# Patient Record
Sex: FEMALE | Race: OTHER | NOT HISPANIC OR LATINO
[De-identification: names, ages, dates, MRNs, and addresses within clinical notes are randomized per-mention and may not be internally consistent; named-entity substitution may affect disease eponyms.]

---

## 2017-02-10 ENCOUNTER — APPOINTMENT (OUTPATIENT)
Dept: RHEUMATOLOGY | Facility: CLINIC | Age: 52
End: 2017-02-10

## 2021-05-01 ENCOUNTER — INPATIENT (INPATIENT)
Facility: HOSPITAL | Age: 56
LOS: 3 days | Discharge: HOME | DRG: 287 | End: 2021-05-05
Attending: INTERNAL MEDICINE | Admitting: INTERNAL MEDICINE
Payer: COMMERCIAL

## 2021-05-01 VITALS
OXYGEN SATURATION: 97 % | HEIGHT: 63 IN | WEIGHT: 138.01 LBS | SYSTOLIC BLOOD PRESSURE: 144 MMHG | TEMPERATURE: 98 F | HEART RATE: 91 BPM | DIASTOLIC BLOOD PRESSURE: 91 MMHG | RESPIRATION RATE: 18 BRPM

## 2021-05-01 LAB
ALBUMIN SERPL ELPH-MCNC: 4.2 G/DL — SIGNIFICANT CHANGE UP (ref 3.3–5)
ALP SERPL-CCNC: 104 U/L — SIGNIFICANT CHANGE UP (ref 40–120)
ALT FLD-CCNC: 13 U/L — SIGNIFICANT CHANGE UP (ref 10–45)
ANION GAP SERPL CALC-SCNC: 12 MMOL/L — SIGNIFICANT CHANGE UP (ref 5–17)
APPEARANCE UR: CLEAR — SIGNIFICANT CHANGE UP
APTT BLD: 32.2 SEC — SIGNIFICANT CHANGE UP (ref 27.5–35.5)
AST SERPL-CCNC: 16 U/L — SIGNIFICANT CHANGE UP (ref 10–40)
BASOPHILS # BLD AUTO: 0.02 K/UL — SIGNIFICANT CHANGE UP (ref 0–0.2)
BASOPHILS NFR BLD AUTO: 0.3 % — SIGNIFICANT CHANGE UP (ref 0–2)
BILIRUB SERPL-MCNC: 0.8 MG/DL — SIGNIFICANT CHANGE UP (ref 0.2–1.2)
BILIRUB UR-MCNC: NEGATIVE — SIGNIFICANT CHANGE UP
BUN SERPL-MCNC: 12 MG/DL — SIGNIFICANT CHANGE UP (ref 7–23)
CALCIUM SERPL-MCNC: 9.2 MG/DL — SIGNIFICANT CHANGE UP (ref 8.4–10.5)
CHLORIDE SERPL-SCNC: 104 MMOL/L — SIGNIFICANT CHANGE UP (ref 96–108)
CO2 SERPL-SCNC: 23 MMOL/L — SIGNIFICANT CHANGE UP (ref 22–31)
COLOR SPEC: COLORLESS — SIGNIFICANT CHANGE UP
CREAT SERPL-MCNC: 0.59 MG/DL — SIGNIFICANT CHANGE UP (ref 0.5–1.3)
DIFF PNL FLD: NEGATIVE — SIGNIFICANT CHANGE UP
EOSINOPHIL # BLD AUTO: 0.15 K/UL — SIGNIFICANT CHANGE UP (ref 0–0.5)
EOSINOPHIL NFR BLD AUTO: 2.1 % — SIGNIFICANT CHANGE UP (ref 0–6)
GAS PNL BLDV: SIGNIFICANT CHANGE UP
GLUCOSE SERPL-MCNC: 97 MG/DL — SIGNIFICANT CHANGE UP (ref 70–99)
GLUCOSE UR QL: NEGATIVE — SIGNIFICANT CHANGE UP
HCT VFR BLD CALC: 41.1 % — SIGNIFICANT CHANGE UP (ref 34.5–45)
HGB BLD-MCNC: 13.2 G/DL — SIGNIFICANT CHANGE UP (ref 11.5–15.5)
IMM GRANULOCYTES NFR BLD AUTO: 0.4 % — SIGNIFICANT CHANGE UP (ref 0–1.5)
INR BLD: 1.14 RATIO — SIGNIFICANT CHANGE UP (ref 0.88–1.16)
KETONES UR-MCNC: NEGATIVE — SIGNIFICANT CHANGE UP
LEUKOCYTE ESTERASE UR-ACNC: NEGATIVE — SIGNIFICANT CHANGE UP
LIDOCAIN IGE QN: 38 U/L — SIGNIFICANT CHANGE UP (ref 7–60)
LYMPHOCYTES # BLD AUTO: 2.18 K/UL — SIGNIFICANT CHANGE UP (ref 1–3.3)
LYMPHOCYTES # BLD AUTO: 30.7 % — SIGNIFICANT CHANGE UP (ref 13–44)
MCHC RBC-ENTMCNC: 28.9 PG — SIGNIFICANT CHANGE UP (ref 27–34)
MCHC RBC-ENTMCNC: 32.1 GM/DL — SIGNIFICANT CHANGE UP (ref 32–36)
MCV RBC AUTO: 90.1 FL — SIGNIFICANT CHANGE UP (ref 80–100)
MONOCYTES # BLD AUTO: 0.29 K/UL — SIGNIFICANT CHANGE UP (ref 0–0.9)
MONOCYTES NFR BLD AUTO: 4.1 % — SIGNIFICANT CHANGE UP (ref 2–14)
NEUTROPHILS # BLD AUTO: 4.44 K/UL — SIGNIFICANT CHANGE UP (ref 1.8–7.4)
NEUTROPHILS NFR BLD AUTO: 62.4 % — SIGNIFICANT CHANGE UP (ref 43–77)
NITRITE UR-MCNC: NEGATIVE — SIGNIFICANT CHANGE UP
NRBC # BLD: 0 /100 WBCS — SIGNIFICANT CHANGE UP (ref 0–0)
NT-PROBNP SERPL-SCNC: 5 PG/ML — SIGNIFICANT CHANGE UP (ref 0–300)
PH UR: 6.5 — SIGNIFICANT CHANGE UP (ref 5–8)
PLATELET # BLD AUTO: 236 K/UL — SIGNIFICANT CHANGE UP (ref 150–400)
POTASSIUM SERPL-MCNC: 3.9 MMOL/L — SIGNIFICANT CHANGE UP (ref 3.5–5.3)
POTASSIUM SERPL-SCNC: 3.9 MMOL/L — SIGNIFICANT CHANGE UP (ref 3.5–5.3)
PROT SERPL-MCNC: 7.5 G/DL — SIGNIFICANT CHANGE UP (ref 6–8.3)
PROT UR-MCNC: NEGATIVE — SIGNIFICANT CHANGE UP
PROTHROM AB SERPL-ACNC: 13.6 SEC — SIGNIFICANT CHANGE UP (ref 10.6–13.6)
RBC # BLD: 4.56 M/UL — SIGNIFICANT CHANGE UP (ref 3.8–5.2)
RBC # FLD: 11.9 % — SIGNIFICANT CHANGE UP (ref 10.3–14.5)
SARS-COV-2 RNA SPEC QL NAA+PROBE: SIGNIFICANT CHANGE UP
SODIUM SERPL-SCNC: 139 MMOL/L — SIGNIFICANT CHANGE UP (ref 135–145)
SP GR SPEC: 1.01 — SIGNIFICANT CHANGE UP (ref 1.01–1.02)
TROPONIN T, HIGH SENSITIVITY RESULT: <6 NG/L — SIGNIFICANT CHANGE UP (ref 0–51)
TROPONIN T, HIGH SENSITIVITY RESULT: <6 NG/L — SIGNIFICANT CHANGE UP (ref 0–51)
UROBILINOGEN FLD QL: NEGATIVE — SIGNIFICANT CHANGE UP
WBC # BLD: 7.11 K/UL — SIGNIFICANT CHANGE UP (ref 3.8–10.5)
WBC # FLD AUTO: 7.11 K/UL — SIGNIFICANT CHANGE UP (ref 3.8–10.5)

## 2021-05-01 PROCEDURE — 76700 US EXAM ABDOM COMPLETE: CPT | Mod: 26

## 2021-05-01 PROCEDURE — 71046 X-RAY EXAM CHEST 2 VIEWS: CPT | Mod: 26

## 2021-05-01 RX ORDER — ASPIRIN/CALCIUM CARB/MAGNESIUM 324 MG
81 TABLET ORAL DAILY
Refills: 0 | Status: DISCONTINUED | OUTPATIENT
Start: 2021-05-01 | End: 2021-05-05

## 2021-05-01 RX ORDER — LOSARTAN POTASSIUM 100 MG/1
50 TABLET, FILM COATED ORAL DAILY
Refills: 0 | Status: DISCONTINUED | OUTPATIENT
Start: 2021-05-01 | End: 2021-05-05

## 2021-05-01 RX ADMIN — Medication 81 MILLIGRAM(S): at 18:35

## 2021-05-01 NOTE — ED CDU PROVIDER INITIAL DAY NOTE - ATTENDING CONTRIBUTION TO CARE
Dr. Dorado (Attending Physician)  I performed a history and physical exam of the patient and discussed their management with the advanced care provider. I reviewed the advanced care provider's note and agree with the documented findings and plan of care. My medical decision making and objective findings are found above.

## 2021-05-01 NOTE — ED CDU PROVIDER DISPOSITION NOTE - ATTENDING CONTRIBUTION TO CARE
Resendez DO: 56F pmhx HTN, HLD, pre-DM who presented to the ED w/ exertional sob and epigastric/cp x 3 weeks, with progressive worsening, who had normal cardiac enzymes in the ED, without metabolic derangements with clear cxr, no radiation of pain and duration/quality of pain not suggestive of vasc etiology, without hypoxia to suggest vte, and no infectious symptoms, who was placed in observation unit for further provocative cardiac testing to further risk stratify for acs. Patients symptoms with some improvement while in the observation unit, however, nuclear stress test resulted abnormal with area of ischemia. This was discussed with on call cardiologist and plan for patient admission.  On my eval, pt aox3, nad, heart s1s2 no murmurs, lungs cta b/l, abd soft ntnd, no peripheral edema, extremities warm and well perfused.

## 2021-05-01 NOTE — ED CDU PROVIDER DISPOSITION NOTE - NSFOLLOWUPINSTRUCTIONS_ED_ALL_ED_FT
- stay hydrated.   - take all home medications as prescribed  - follow up with your pcp in 1-2 days.  - follow up with cardiology and GI within the next week, call numbers attached to make an appointment.   - return if symptoms worsen, weakness, shortness of breath, difficulty breathing and all other concerns.

## 2021-05-01 NOTE — ED ADULT NURSE NOTE - OBJECTIVE STATEMENT
Pt is a 55 yo F who came to the Ed amb c/o abd pain x one month. Pain is "achy", across upper abd, worse on right side and radiates to chest. Worse with movement. Pt was seen in an ER in [place] twice last week, had a negative workups but symptoms continue. No sob/palpitations, No n/v/d. No fever/chills. No bleeding, no change in urinary/bowel habits. A/O x3.

## 2021-05-01 NOTE — ED ADULT NURSE NOTE - NSIMPLEMENTINTERV_GEN_ALL_ED
Implemented All Universal Safety Interventions:  [place] to call system. Call bell, personal items and telephone within reach. Instruct patient to call for assistance. Room bathroom lighting operational. Non-slip footwear when patient is off stretcher. Physically safe environment: no spills, clutter or unnecessary equipment. Stretcher in lowest position, wheels locked, appropriate side rails in place.

## 2021-05-01 NOTE — ED PROVIDER NOTE - PROGRESS NOTE DETAILS
Perry: called [facility] ED, patient had non con abdomen last week that was negative, patient had a cta that was negative for PE as well Perry: RWJ tests show cta chest and ct abd/pelvis w/ iv contrast was negative for acute pathology on Thursday as well, dc'ed ct's here, labs wnl, placed in cdu for cardiac workup

## 2021-05-01 NOTE — ED CDU PROVIDER DISPOSITION NOTE - CLINICAL COURSE
56 year old Female with pmhx HTN, HLD, pre-diabetes presents to ED c/o w/ exertional sob and epigastric/chest pain x3 weeks, progressively worsening this past week. Pt reports never had similar in the past was seen in OSH ED 2 times last week w/  CTA chest and CT a/p w/ iv contrast  which were negative for acute pathology. Had 1 prior stress test "many years ago" which she states was normal. Does not regularly see a Cardiologist.  Denies fevers, chills, cough, n/v/d.   	In ED patient had labs which were unremarkable including initial trop <6. She also had US abd and CXR which were also normal studies. Pt accepted to CDU for further work-up including TTE and Nuc Stress Test in am.      In CDU___________, stress test showed_____, echo showed_________. 56 year old Female with pmhx HTN, HLD, pre-diabetes presents to ED c/o w/ exertional sob and epigastric/chest pain x3 weeks, progressively worsening this past week. Pt reports never had similar in the past was seen in OSH ED 2 times last week w/  CTA chest and CT a/p w/ iv contrast  which were negative for acute pathology. Had 1 prior stress test "many years ago" which she states was normal. Does not regularly see a Cardiologist.  Denies fevers, chills, cough, n/v/d.   	In ED patient had labs which were unremarkable including initial trop <6. She also had US abd and CXR which were also normal studies. Pt accepted to CDU for further work-up including TTE and Nuc Stress Test in am.      In CDU no events occurred over tele and pt remained stable. stress consistent with ischemia to basal septal wall. discussed with cardiology unattached Dr. Carlos who recommended admission to Dr. Siegel. ED attending Dr. ricci aware and agreed with above

## 2021-05-01 NOTE — ED CDU PROVIDER INITIAL DAY NOTE - PROGRESS NOTE DETAILS
Pt resting comfortably. NAD. No complaints. VSS. no chest pain, shortness of breath or difficulty breathing. no events on tele, declining need for pain medication at this time. lungs CTABL, heart RRR, no m/g/r, will cont to monitor, pending stress and echo in the am.

## 2021-05-01 NOTE — ED PROVIDER NOTE - NS ED ROS FT
General: denies fever, chills  HENT: denies nasal congestion, rhinorrhea  CV: + chest pain, denies palpitations  Resp: + difficulty breathing, denies cough  Abdominal: denies nausea, vomiting, diarrhea, + abdominal pain  MSK: denies muscle aches, leg swelling  Neuro: denies headaches, numbness, tingling  Heme: denies petechia, abnormal bleeding

## 2021-05-01 NOTE — ED ADULT NURSE NOTE - CHIEF COMPLAINT QUOTE
left sided chest/abd pain x 2 days, was seen by cardiologist on thursday (2 days ago) and was told her EKG was abnormal and that she had an "MI", was sent for ct of chest and a cardiac work up with no acute findings, concerned now in addition to her chest pain the left sided abd pain that was never addressed or further examined  denies any prior known cardiac hx before being seen by her MD 2 days ago, verbalizes having family cardiac hx

## 2021-05-01 NOTE — ED CDU PROVIDER INITIAL DAY NOTE - OBJECTIVE STATEMENT
56 year old Female with pmhx HTN, HLD, pre-diabetes presents to ED c/o w/ exertional sob and epigastric/chest pain x3 weeks, progressively worsening this past week. Pt reports never had similar in the past was seen in OSH ED 2 times last week w/  CTA chest and CT a/p w/ iv contrast  which were negative for acute pathology. Had 1 prior stress test "many years ago" which she states was normal. Does not regularly see a Cardiologist.  Denies fevers, chills, cough, n/v/d.     In ED patient had labs which were unremarkable including initial trop <6. She also had US abd and CXR which were also normal studies. Pt accepted to CDU for further work-up including TTE and Nuc Stress Test in am.

## 2021-05-01 NOTE — ED CDU PROVIDER INITIAL DAY NOTE - MEDICAL DECISION MAKING DETAILS
Dr. Dorado (Attending Physician)  56F pmhx HTN, HLD, pre-DM who presented to the ED w/ exertional epigastric/cp and sob increasing over past 3 weeks, negative CTA for PE 2 days ago,  who had normal cardiac enzymes in the ED, no radiation of pain and duration/quality of pain not suggestive of vasc etiology, without hypoxia to suggest vte, and no infectious symptoms, will place in observation unit for further provocative cardiac testing to further risk stratify for acs.

## 2021-05-01 NOTE — ED PROVIDER NOTE - CLINICAL SUMMARY MEDICAL DECISION MAKING FREE TEXT BOX
56F w/ h/o HTN, HLD, presents w/ exertional chest/epigastric pain associated w/ SOB, tender on exam, will repeat ct abd/pelvis w/ IV contrast, no PE on prior cta, will check labs, RUQ sono, reassess, likely tba, no pedal edema, low suspicion for dvt 56F w/ h/o HTN, HLD, presents w/ exertional chest/epigastric pain associated w/ SOB, tender on exam, will repeat ct abd/pelvis w/ IV contrast, no PE on prior cta, will check labs, RUQ sono, reassess, likely tba, no pedal edema, low suspicion for dvt  Dr. Dorado (Attending Physician)

## 2021-05-01 NOTE — ED PROVIDER NOTE - PHYSICAL EXAMINATION
CONSTITUTIONAL: NAD, awake, alert  HEAD: Normocephalic; atraumatic  ENMT: External appears normal, MMM  NECK: no tenderness, FROM  CARD: Normal Sl, S2; no audible murmurs  RESP: normal wob, lungs ctab  ABD: soft, non-distended; + epigastric, LUQ, and RUQ tenderness  MSK: no edema, normal ROM in all four extremities  SKIN: Warm, dry, no rashes  NEURO: aaox3, moving all extremities spontaneously

## 2021-05-01 NOTE — ED PROVIDER NOTE - OBJECTIVE STATEMENT
56F w/ h/o HTN, HLD, pre-diabetes presents w/ exertional sob and epigastric/chest pain x3 weeks, progressively worsening. Denies n/v/d, f/c. States she has not had similar symptoms in the past, denies sick contacts. Was seen in [facility] ED twice last week w/ a negative workup so far.

## 2021-05-01 NOTE — ED CDU PROVIDER INITIAL DAY NOTE - PHYSICAL EXAMINATION
CONSTITUTIONAL: Patient is awake, alert and oriented x 3. Patient is well appearing and in no acute distress  HEAD: NCAT  NECK: supple, FROM  LUNGS: CTA B/L  HEART: RRR  ABDOMEN: Soft nd, mild epigastric ttp otherwise nttp,  no rebound or guarding  EXTREMITY: no edema or calf tenderness b/l, FROM upper and lower ext b/l  SKIN: with no rash or lesions  NEURO: No focal deficits

## 2021-05-01 NOTE — ED PROVIDER NOTE - ATTENDING CONTRIBUTION TO CARE
Dr. Dorado (Attending Physician)  I performed a history and physical exam of the patient and discussed their management with the resident. I reviewed the resident's note and agree with the documented findings and plan of care. My medical decision making and observations are found above.

## 2021-05-02 DIAGNOSIS — R07.9 CHEST PAIN, UNSPECIFIED: ICD-10-CM

## 2021-05-02 DIAGNOSIS — I10 ESSENTIAL (PRIMARY) HYPERTENSION: ICD-10-CM

## 2021-05-02 LAB
A1C WITH ESTIMATED AVERAGE GLUCOSE RESULT: 5.5 % — SIGNIFICANT CHANGE UP (ref 4–5.6)
CHOLEST SERPL-MCNC: 163 MG/DL — SIGNIFICANT CHANGE UP
CK MB CFR SERPL CALC: 1 NG/ML — SIGNIFICANT CHANGE UP (ref 0–3.8)
CK SERPL-CCNC: 75 U/L — SIGNIFICANT CHANGE UP (ref 25–170)
CULTURE RESULTS: SIGNIFICANT CHANGE UP
ESTIMATED AVERAGE GLUCOSE: 111 MG/DL — SIGNIFICANT CHANGE UP (ref 68–114)
GLUCOSE BLDC GLUCOMTR-MCNC: 86 MG/DL — SIGNIFICANT CHANGE UP (ref 70–99)
HDLC SERPL-MCNC: 43 MG/DL — LOW
LIPID PNL WITH DIRECT LDL SERPL: 101 MG/DL — HIGH
NON HDL CHOLESTEROL: 120 MG/DL — SIGNIFICANT CHANGE UP
SPECIMEN SOURCE: SIGNIFICANT CHANGE UP
TRIGL SERPL-MCNC: 97 MG/DL — SIGNIFICANT CHANGE UP
TROPONIN T, HIGH SENSITIVITY RESULT: <6 NG/L — SIGNIFICANT CHANGE UP (ref 0–51)

## 2021-05-02 PROCEDURE — 78452 HT MUSCLE IMAGE SPECT MULT: CPT | Mod: 26

## 2021-05-02 PROCEDURE — 93306 TTE W/DOPPLER COMPLETE: CPT | Mod: 26

## 2021-05-02 PROCEDURE — 93018 CV STRESS TEST I&R ONLY: CPT

## 2021-05-02 PROCEDURE — 93016 CV STRESS TEST SUPVJ ONLY: CPT

## 2021-05-02 RX ORDER — ENOXAPARIN SODIUM 100 MG/ML
40 INJECTION SUBCUTANEOUS DAILY
Refills: 0 | Status: DISCONTINUED | OUTPATIENT
Start: 2021-05-02 | End: 2021-05-05

## 2021-05-02 RX ORDER — FAMOTIDINE 10 MG/ML
20 INJECTION INTRAVENOUS ONCE
Refills: 0 | Status: DISCONTINUED | OUTPATIENT
Start: 2021-05-02 | End: 2021-05-02

## 2021-05-02 RX ORDER — LIDOCAINE 4 G/100G
10 CREAM TOPICAL ONCE
Refills: 0 | Status: COMPLETED | OUTPATIENT
Start: 2021-05-02 | End: 2021-05-02

## 2021-05-02 RX ORDER — ACETAMINOPHEN 500 MG
975 TABLET ORAL ONCE
Refills: 0 | Status: COMPLETED | OUTPATIENT
Start: 2021-05-02 | End: 2021-05-02

## 2021-05-02 RX ORDER — FAMOTIDINE 10 MG/ML
20 INJECTION INTRAVENOUS ONCE
Refills: 0 | Status: COMPLETED | OUTPATIENT
Start: 2021-05-02 | End: 2021-05-02

## 2021-05-02 RX ORDER — FAMOTIDINE 10 MG/ML
20 INJECTION INTRAVENOUS DAILY
Refills: 0 | Status: DISCONTINUED | OUTPATIENT
Start: 2021-05-02 | End: 2021-05-02

## 2021-05-02 RX ADMIN — Medication 81 MILLIGRAM(S): at 11:42

## 2021-05-02 RX ADMIN — LIDOCAINE 10 MILLILITER(S): 4 CREAM TOPICAL at 04:53

## 2021-05-02 RX ADMIN — LOSARTAN POTASSIUM 50 MILLIGRAM(S): 100 TABLET, FILM COATED ORAL at 05:59

## 2021-05-02 RX ADMIN — Medication 30 MILLILITER(S): at 04:53

## 2021-05-02 RX ADMIN — FAMOTIDINE 20 MILLIGRAM(S): 10 INJECTION INTRAVENOUS at 05:04

## 2021-05-02 RX ADMIN — Medication 975 MILLIGRAM(S): at 06:49

## 2021-05-02 RX ADMIN — Medication 975 MILLIGRAM(S): at 04:55

## 2021-05-02 RX ADMIN — ENOXAPARIN SODIUM 40 MILLIGRAM(S): 100 INJECTION SUBCUTANEOUS at 21:30

## 2021-05-02 NOTE — H&P ADULT - ASSESSMENT
56F w/ h/o HTN, HLD, pre-diabetes presents w/ exertional sob and epigastric/chest pain x3 weeks, progressively worsening. Denies n/v/d, f/c. States she has not had similar symptoms in the past, denies sick contacts. Was seen in [facility] ED twice last week w/ a negative workup so far.

## 2021-05-02 NOTE — ED CDU PROVIDER SUBSEQUENT DAY NOTE - HISTORY
Pt resting comfortably. NAD. No complaints. VSS. no cp, shortness of breath, difficulty breathing no events on tele will continue to monitor. -Liz Canchola PA-C Pt resting comfortably. NAD. VSS. c/o mild epigastric abd pain, + ttp epigastrium, no cp, shortness of breath, difficulty breathing no events on tele will continue to monitor. will give Gi cocktail and reassess -Liz Canchola PA-C

## 2021-05-02 NOTE — H&P ADULT - NSHPPHYSICALEXAM_GEN_ALL_CORE
General: WN/WD NAD  PERRLA  Neurology: A&Ox3, nonfocal, MOHAN x 4  Respiratory: CTA B/L  CV: RRR, S1S2, no murmurs, rubs or gallops  Abdominal: Soft, NT, ND +BS, Last BM  Extremities: No edema, + peripheral pulses  Skin Normal

## 2021-05-02 NOTE — ED CDU PROVIDER SUBSEQUENT DAY NOTE - PROGRESS NOTE DETAILS
Pt resting comfortably. NAD. No complaints. VSS. no cp/sob/difficulty breathing, no events on tele, NSR. will continue to monitor. Pt resting comfortably. NAD. No complaints. VSS. +improvement of abd pain s.p GI cocktail, no cp/sob/difficulty breathing, no events on tele, NSR. will continue to monitor. LISA Lopez: Patient seen at bedside in NAD.  VSS.  Patient resting comfortably without complaints. no events over tele. substernal chest pain improved with GI cocktail. no events over tele. patient going to stress. pending TTE results LISA Lopez: Patient seen at bedside in NAD.  VSS.  Patient resting comfortably without complaints. NSR on tele. pending stress/ TTE results LISA Lopez: stress consistent with ischemia to basal septal wall. discussed with cardiology unattached Dr. Carlos who recommended admission to Dr. Siegel. ED attending Dr. ricci aware and agreed with above

## 2021-05-02 NOTE — ED ADULT NURSE REASSESSMENT NOTE - SYMPTOMS
LUQ/back- no change from earlier and declining pain meds/pain:
LUQ/lower left chest/back/pain:
constant left upper abd around to back per pt. CDU PA Willsey at bedside and aware. pt declining any pain meds at present/pain:

## 2021-05-02 NOTE — H&P ADULT - HISTORY OF PRESENT ILLNESS
56F w/ h/o HTN, HLD, pre-diabetes presents w/ exertional sob and epigastric/chest pain x3 weeks, progressively worsening. Denies n/v/d, f/c. States she has not had similar symptoms in the past, denies sick contacts. Was seen in [facility] ED twice last week w/ a negative workup so far.

## 2021-05-02 NOTE — ED ADULT NURSE REASSESSMENT NOTE - NS ED NURSE REASSESS COMMENT FT1
report taken from Laurie FUCHS. states pt had good night with no complaints. Will continue to monitor.

## 2021-05-02 NOTE — ED ADULT NURSE REASSESSMENT NOTE - GENERAL PATIENT STATE
comfortable appearance/cooperative
resting/sleeping
resting/sleeping
comfortable appearance/cooperative

## 2021-05-03 LAB
ALBUMIN SERPL ELPH-MCNC: 3.9 G/DL — SIGNIFICANT CHANGE UP (ref 3.3–5)
ALP SERPL-CCNC: 95 U/L — SIGNIFICANT CHANGE UP (ref 40–120)
ALT FLD-CCNC: 14 U/L — SIGNIFICANT CHANGE UP (ref 10–45)
ANION GAP SERPL CALC-SCNC: 12 MMOL/L — SIGNIFICANT CHANGE UP (ref 5–17)
AST SERPL-CCNC: 15 U/L — SIGNIFICANT CHANGE UP (ref 10–40)
BILIRUB SERPL-MCNC: 0.8 MG/DL — SIGNIFICANT CHANGE UP (ref 0.2–1.2)
BUN SERPL-MCNC: 20 MG/DL — SIGNIFICANT CHANGE UP (ref 7–23)
CALCIUM SERPL-MCNC: 9 MG/DL — SIGNIFICANT CHANGE UP (ref 8.4–10.5)
CHLORIDE SERPL-SCNC: 106 MMOL/L — SIGNIFICANT CHANGE UP (ref 96–108)
CK SERPL-CCNC: 58 U/L — SIGNIFICANT CHANGE UP (ref 25–170)
CO2 SERPL-SCNC: 19 MMOL/L — LOW (ref 22–31)
CREAT SERPL-MCNC: 0.6 MG/DL — SIGNIFICANT CHANGE UP (ref 0.5–1.3)
GLUCOSE SERPL-MCNC: 92 MG/DL — SIGNIFICANT CHANGE UP (ref 70–99)
HCT VFR BLD CALC: 41.2 % — SIGNIFICANT CHANGE UP (ref 34.5–45)
HCV AB S/CO SERPL IA: 0.12 S/CO — SIGNIFICANT CHANGE UP (ref 0–0.99)
HCV AB SERPL-IMP: SIGNIFICANT CHANGE UP
HGB BLD-MCNC: 13.3 G/DL — SIGNIFICANT CHANGE UP (ref 11.5–15.5)
MCHC RBC-ENTMCNC: 29 PG — SIGNIFICANT CHANGE UP (ref 27–34)
MCHC RBC-ENTMCNC: 32.3 GM/DL — SIGNIFICANT CHANGE UP (ref 32–36)
MCV RBC AUTO: 90 FL — SIGNIFICANT CHANGE UP (ref 80–100)
NRBC # BLD: 0 /100 WBCS — SIGNIFICANT CHANGE UP (ref 0–0)
PLATELET # BLD AUTO: 192 K/UL — SIGNIFICANT CHANGE UP (ref 150–400)
POTASSIUM SERPL-MCNC: 4 MMOL/L — SIGNIFICANT CHANGE UP (ref 3.5–5.3)
POTASSIUM SERPL-SCNC: 4 MMOL/L — SIGNIFICANT CHANGE UP (ref 3.5–5.3)
PROT SERPL-MCNC: 6.8 G/DL — SIGNIFICANT CHANGE UP (ref 6–8.3)
RBC # BLD: 4.58 M/UL — SIGNIFICANT CHANGE UP (ref 3.8–5.2)
RBC # FLD: 12 % — SIGNIFICANT CHANGE UP (ref 10.3–14.5)
SODIUM SERPL-SCNC: 137 MMOL/L — SIGNIFICANT CHANGE UP (ref 135–145)
WBC # BLD: 6.5 K/UL — SIGNIFICANT CHANGE UP (ref 3.8–10.5)
WBC # FLD AUTO: 6.5 K/UL — SIGNIFICANT CHANGE UP (ref 3.8–10.5)

## 2021-05-03 PROCEDURE — 93458 L HRT ARTERY/VENTRICLE ANGIO: CPT | Mod: 26

## 2021-05-03 RX ORDER — CLOPIDOGREL BISULFATE 75 MG/1
600 TABLET, FILM COATED ORAL ONCE
Refills: 0 | Status: COMPLETED | OUTPATIENT
Start: 2021-05-03 | End: 2021-05-03

## 2021-05-03 RX ORDER — ATROPINE SULFATE 0.1 MG/ML
0.5 SYRINGE (ML) INJECTION ONCE
Refills: 0 | Status: COMPLETED | OUTPATIENT
Start: 2021-05-03 | End: 2021-05-03

## 2021-05-03 RX ORDER — POLYETHYLENE GLYCOL 3350 17 G/17G
17 POWDER, FOR SOLUTION ORAL DAILY
Refills: 0 | Status: DISCONTINUED | OUTPATIENT
Start: 2021-05-03 | End: 2021-05-05

## 2021-05-03 RX ADMIN — CLOPIDOGREL BISULFATE 600 MILLIGRAM(S): 75 TABLET, FILM COATED ORAL at 09:39

## 2021-05-03 RX ADMIN — Medication 81 MILLIGRAM(S): at 09:38

## 2021-05-03 RX ADMIN — ENOXAPARIN SODIUM 40 MILLIGRAM(S): 100 INJECTION SUBCUTANEOUS at 21:51

## 2021-05-03 RX ADMIN — LOSARTAN POTASSIUM 50 MILLIGRAM(S): 100 TABLET, FILM COATED ORAL at 05:11

## 2021-05-03 RX ADMIN — POLYETHYLENE GLYCOL 3350 17 GRAM(S): 17 POWDER, FOR SOLUTION ORAL at 06:00

## 2021-05-03 RX ADMIN — Medication 0.5 MILLIGRAM(S): at 10:47

## 2021-05-03 NOTE — PROGRESS NOTE ADULT - ASSESSMENT
56F w/ h/o HTN, HLD, pre-diabetes presents w/ exertional sob and epigastric/chest pain x3 weeks, progressively worsening. Denies n/v/d, f/c. States she has not had similar symptoms in the past, denies sick contacts. Was seen in [facility] ED twice last week w/ a negative workup so far.     Problem/Plan - 1:  ·  Problem: Chest pain.  Plan: telemonitor   cards fu  will monitor.   cath -ve  further management as per cards     Problem/Plan - 2:  ·  Problem: HTN (hypertension).  Plan: DASH diet  cw current meds.

## 2021-05-04 ENCOUNTER — TRANSCRIPTION ENCOUNTER (OUTPATIENT)
Age: 56
End: 2021-05-04

## 2021-05-04 DIAGNOSIS — R10.9 UNSPECIFIED ABDOMINAL PAIN: ICD-10-CM

## 2021-05-04 DIAGNOSIS — R06.00 DYSPNEA, UNSPECIFIED: ICD-10-CM

## 2021-05-04 LAB
ANION GAP SERPL CALC-SCNC: 16 MMOL/L — SIGNIFICANT CHANGE UP (ref 5–17)
BUN SERPL-MCNC: 18 MG/DL — SIGNIFICANT CHANGE UP (ref 7–23)
CALCIUM SERPL-MCNC: 8.9 MG/DL — SIGNIFICANT CHANGE UP (ref 8.4–10.5)
CHLORIDE SERPL-SCNC: 107 MMOL/L — SIGNIFICANT CHANGE UP (ref 96–108)
CO2 SERPL-SCNC: 20 MMOL/L — LOW (ref 22–31)
COVID-19 SPIKE DOMAIN AB INTERP: NEGATIVE — SIGNIFICANT CHANGE UP
COVID-19 SPIKE DOMAIN ANTIBODY RESULT: 0.4 U/ML — SIGNIFICANT CHANGE UP
CREAT SERPL-MCNC: 0.63 MG/DL — SIGNIFICANT CHANGE UP (ref 0.5–1.3)
D DIMER BLD IA.RAPID-MCNC: <150 NG/ML DDU — SIGNIFICANT CHANGE UP
GLUCOSE SERPL-MCNC: 96 MG/DL — SIGNIFICANT CHANGE UP (ref 70–99)
HCT VFR BLD CALC: 40.2 % — SIGNIFICANT CHANGE UP (ref 34.5–45)
HGB BLD-MCNC: 12.9 G/DL — SIGNIFICANT CHANGE UP (ref 11.5–15.5)
MCHC RBC-ENTMCNC: 28.7 PG — SIGNIFICANT CHANGE UP (ref 27–34)
MCHC RBC-ENTMCNC: 32.1 GM/DL — SIGNIFICANT CHANGE UP (ref 32–36)
MCV RBC AUTO: 89.3 FL — SIGNIFICANT CHANGE UP (ref 80–100)
NRBC # BLD: 0 /100 WBCS — SIGNIFICANT CHANGE UP (ref 0–0)
PLATELET # BLD AUTO: 218 K/UL — SIGNIFICANT CHANGE UP (ref 150–400)
POTASSIUM SERPL-MCNC: 4.2 MMOL/L — SIGNIFICANT CHANGE UP (ref 3.5–5.3)
POTASSIUM SERPL-SCNC: 4.2 MMOL/L — SIGNIFICANT CHANGE UP (ref 3.5–5.3)
RBC # BLD: 4.5 M/UL — SIGNIFICANT CHANGE UP (ref 3.8–5.2)
RBC # FLD: 11.9 % — SIGNIFICANT CHANGE UP (ref 10.3–14.5)
SARS-COV-2 IGG+IGM SERPL QL IA: 0.4 U/ML — SIGNIFICANT CHANGE UP
SARS-COV-2 IGG+IGM SERPL QL IA: NEGATIVE — SIGNIFICANT CHANGE UP
SODIUM SERPL-SCNC: 143 MMOL/L — SIGNIFICANT CHANGE UP (ref 135–145)
TSH SERPL-MCNC: 6.39 UIU/ML — HIGH (ref 0.27–4.2)
WBC # BLD: 5.95 K/UL — SIGNIFICANT CHANGE UP (ref 3.8–10.5)
WBC # FLD AUTO: 5.95 K/UL — SIGNIFICANT CHANGE UP (ref 3.8–10.5)

## 2021-05-04 PROCEDURE — 74177 CT ABD & PELVIS W/CONTRAST: CPT | Mod: 26

## 2021-05-04 PROCEDURE — 71275 CT ANGIOGRAPHY CHEST: CPT | Mod: 26

## 2021-05-04 RX ORDER — LOSARTAN POTASSIUM 100 MG/1
1 TABLET, FILM COATED ORAL
Qty: 0 | Refills: 0 | DISCHARGE
Start: 2021-05-04

## 2021-05-04 RX ADMIN — LOSARTAN POTASSIUM 50 MILLIGRAM(S): 100 TABLET, FILM COATED ORAL at 05:56

## 2021-05-04 RX ADMIN — Medication 81 MILLIGRAM(S): at 12:21

## 2021-05-04 NOTE — DISCHARGE NOTE PROVIDER - CARE PROVIDER_API CALL
Tatum Thomas  FAMILY MEDICINE  [address] [place] [id number]  Phone: [phone]  Fax: [phone]  Established Patient  Follow Up Time: 1 week    Carlos Farnsworth (MD)  Critical Care Medicine  [address] [place] [address] [address]  [place] [address]  Phone: [phone]  Fax: [phone]  Established Patient  Follow Up Time: 1 week

## 2021-05-04 NOTE — CONSULT NOTE ADULT - PROBLEM SELECTOR RECOMMENDATION 2
pain seems more LUQ and LLL abdominal pain rather than chest pain  -Had CT A/P at OSH recently which was unremarkable pain seems more LUQ and LLL abdominal pain rather than chest pain  -Had CT A/P at OSH recently which was unremarkable as per report

## 2021-05-04 NOTE — CONSULT NOTE ADULT - SUBJECTIVE AND OBJECTIVE BOX
PULMONARY CONSULT    HPI: 55 y/o F with PMH HTN, HLD, pre-diabetes. Presents with exertional SOB x 2 weeks, ?chest pain. Endorses ROXANA and LLQ abdominal pain, worse with palpation for the past month. Recent ED admission in [place] - with negative CT A/P (report in chart). S/p cardiac cath with non obstructive CAD. CXR with clear lungs, ddimer <150. Denies fevers, chills, cough, constipation, pleuritic CP, constipation, diarrhea.     PAST MEDICAL & SURGICAL HISTORY:  HTN (hypertension)  High cholesterol  No significant past surgical history      Allergies  No Known Allergies    FAMILY HISTORY:  Family history of MI (myocardial infarction) (Mother)    Social history: never a smoker     Review of Systems:  CONSTITUTIONAL: No fever, chills, or fatigue  EYES: No eye pain, visual disturbances, or discharge  ENMT:  No difficulty hearing, tinnitus, vertigo; No sinus or throat pain  NECK: No pain or stiffness  RESPIRATORY: Per above  CARDIOVASCULAR: Per above   GASTROINTESTINAL: Per above   GENITOURINARY: No dysuria, frequency, hematuria, or incontinence  NEUROLOGICAL: No headaches, memory loss, loss of strength, numbness, or tremors  SKIN: No itching, burning, rashes, or lesions   MUSCULOSKELETAL: No joint pain or swelling; No muscle, back, or extremity pain  PSYCHIATRIC: No depression, anxiety, mood swings, or difficulty sleeping      Medications:  MEDICATIONS  (STANDING):  aspirin  chewable 81 milliGRAM(s) Oral daily  enoxaparin Injectable 40 milliGRAM(s) SubCutaneous daily  losartan 50 milliGRAM(s) Oral daily  polyethylene glycol 3350 17 Gram(s) Oral daily          Vital Signs Last 24 Hrs  T(C): 36.3 (04 May 2021 12:34), Max: 36.9 (04 May 2021 00:36)  T(F): 97.3 (04 May 2021 12:34), Max: 98.4 (04 May 2021 00:36)  HR: 94 (04 May 2021 12:34) (84 - 95)  BP: 117/78 (04 May 2021 12:34) (102/62 - 125/62)  BP(mean): --  RR: 18 (04 May 2021 12:34) (16 - 18)  SpO2: 97% (04 May 2021 12:34) (97% - 99%)            -03 @ 07:01  -   @ 07:00  --------------------------------------------------------  IN: 600 mL / OUT: 0 mL / NET: 600 mL          LABS:                        12.9   5.95  )-----------( 218      ( 04 May 2021 07:22 )             40.2         143  |  107  |  18  ----------------------------<  96  4.2   |  20<L>  |  0.63    Ca    8.9      04 May 2021 07:22    TPro  6.8  /  Alb  3.9  /  TBili  0.8  /  DBili  x   /  AST  15  /  ALT  14  /  AlkPhos  95  05-03      CARDIAC MARKERS ( 03 May 2021 06:54 )  x     / x     / 58 U/L / x     / x      CARDIAC MARKERS ( 02 May 2021 18:16 )  x     / x     / 75 U/L / x     / 1.0 ng/mL          Serum Pro-Brain Natriuretic Peptide: 5 pg/mL (21 @ 14:32)              CULTURES:       Culture - Urine (collected 21 @ 18:26)  Source: .Urine Clean Catch (Midstream)  Final Report (21 @ 14:24):    <10,000 CFU/mL Normal Urogenital Shakila            Physical Examination:    General: No acute distress.      HEENT: Pupils equal, reactive to light.  Symmetric.    PULM: Clear to auscultation bilaterally, no significant sputum production    CVS: RRR    ABD: Soft, nondistended, nontender, normoactive bowel sounds, no masses    EXT: No edema, nontender    SKIN: Warm and well perfused, no rashes noted.    NEURO: Alert, oriented, interactive, nonfocal      RADIOLOGY REVIEWED  CXR: clear lungs     Cath lab: < from: Cardiac Cath Lab - Adult (21 @ 09:58) >  INDICATIONS: Abnormal stress test.  HISTORY: 56 year old F with HTN, HLD with + stress,presenting for [facility].  There was no prior cardiac history. The patient has hypertension and  dyslipidemia.  PROCEDURE:  --  Left heart catheterization.  --  Left coronary angiography.  --  Right coronary angiography.  TECHNIQUE: The risks and alternatives of the procedures and conscious  sedation were explained to the patient and informed consent was obtained.  Cardiac catheterization performed electively.  Local anesthetic given. Right femoral artery access. Left heart  catheterization. Left coronary artery angiography. The vessel was injected  utilizing a catheter. Right coronary artery angiography. The vessel was  injected utilizing a catheter. RADIATION EXPOSURE: 3.2 min.  CONTRAST GIVEN: Omnipaque 25 ml. An IABP was not used.  MEDICATIONS GIVEN: Fentanyl, 25 mcg, IV. Midazolam, 1 mg, IV.  CORONARY VESSELS: The coronary circulation is right dominant.  LM:   --  LM: Angiography showed minor luminal irregularities with no flow  limiting lesions.  LAD:   --  LAD: Angiography showed minor luminal irregularities with no  flow limiting lesions.  CX:   --  Circumflex: Angiography showed minor luminal irregularities with  no flow limiting lesions.  RCA:   --  RCA: Angiography showed minor luminal irregularities with no  flow limiting lesions.  COMPLICATIONS: There were no complications.  DIAGNOSTIC RECOMMENDATIONS: Mild luminal irregularities. Recommend  aggressive medical therapy for non-obstructive coronary artery disease.  Findings relayed to patient and patient's cardiologist.  INTERVENTIONAL RECOMMENDATIONS: Mild luminal irregularities. Recommend  aggressive medical therapy for non-obstructive coronary artery disease.  Findings relayed to patient and patient's cardiologist.  Prepared and signed by  Shine Alcaraz M.D.  Signed 2021 16:25:42  HEMODYNAMIC TABLES  Pressures:  Baseline  Pressures:  - HR: 85  Pressures:  - Rhythm:  Pressures:  -- Aortic Pressure (S/D/M): 131/85/110  Pressures:  -- Left Ventricle (s/edp): 132/10/--  Outputs:  Baseline  Outputs:  -- CALCULATIONS[age]  Outputs:  -- CALCULATIONS: Body Surface Area: 1.63  Outputs:  -- CALCULATIONS: Height in cm: 157.00  Outputs:  -- CALCULATIONS: Sex: Female  Outputs:  -- CALCULATIONS: Weight in k.60    < end of copied text >    
CHIEF COMPLAINT:Patient is a 56y old  Female who presents with a chief complaint of SOB (02 May 2021 14:36)      HISTORY OF PRESENT ILLNESS:    56 female with history as below presents with few weeks of cp / sob worse with activity   had stress test with abnormal results     PAST MEDICAL & SURGICAL HISTORY:  HTN (hypertension)    High cholesterol    No significant past surgical history            MEDICATIONS:  aspirin  chewable 81 milliGRAM(s) Oral daily  enoxaparin Injectable 40 milliGRAM(s) SubCutaneous daily  losartan 50 milliGRAM(s) Oral daily          polyethylene glycol 3350 17 Gram(s) Oral daily          FAMILY HISTORY:  Family history of MI (myocardial infarction) (Mother)        Non-contributory    SOCIAL HISTORY:    No tobacco, drugs or etoh    Allergies    No Known Allergies    Intolerances    	    REVIEW OF SYSTEMS:  as above  The rest of the 14 points ROS reviewed and except above they are unremarkable.        PHYSICAL EXAM:  T(C): 36.7 (05-03-21 @ 04:31), Max: 37.2 (05-02-21 @ 11:43)  HR: 75 (05-03-21 @ 04:31) (75 - 103)  BP: 121/85 (05-03-21 @ 04:31) (118/75 - 137/84)  RR: 18 (05-03-21 @ 04:31) (16 - 18)  SpO2: 98% (05-03-21 @ 04:31) (98% - 100%)  Wt(kg): --  I&O's Summary    02 May 2021 07:01  -  03 May 2021 07:00  --------------------------------------------------------  IN: 120 mL / OUT: 0 mL / NET: 120 mL        Appearance: Normal	  HEENT:   no gross abnormality   Cardiovascular: Normal S1 S2,    Murmur:   Neck: JVP normal  Respiratory: Lungs clear   Gastrointestinal:  Soft, Non-tender  Skin: normal   Neuro: No gross deficits.   Psychiatry:  Mood & affect flat  Ext: No edema    LABS/DATA:    TELEMETRY: 	    ECG:  	   	  CARDIAC MARKERS:    < from: Nuclear Stress Test-Pharmacologic (05.02.21 @ 11:37) >  IMPRESSIONS:Abnormal Study  * Chest Pain: No chest pain with administration of  Regadenoson.  * Symptom: Shortness of breath.  * HR Response: Excessive increase in HR with stress due to  poor physical condition and/or reduced cardiovascular  reserve.  * BP Response: Appropriate.  * Heart Rhythm: Normal Sinus Rhythm - 78 BPM.  * Baseline ECG: Nonspecific ST-T wave abnormality in III,  V3.  * ECG Abnormalities: None.  * Arrhythmia: None.  * Small, mild defect in the basal septal wall that is  reversible with abnormal wall motionconsistent with  ischemia.  * Post-stress gated wall motion analysis was performed  (LVEF > 70%;LVEDV = 40 ml.), revealing hypokinesis in  basal septal wall(s).  Conclusion:  Small, mild defect in the basal septal wall that is  reversible with abnormal wall motion consistent with  ischemia.  ------------------------------------------------------------------------  Confirmed on  5/2/2021 - 13:52:29 by Silas Jalloh D.O.  ------------------------------------------------------------------------    < end of copied text >  < from: TTE with Doppler (w/Cont) (05.02.21 @ 07:46) >  Conclusions:  1. Normal left ventricular internal dimensions and wall  thicknesses.  2. Normal left ventricular systolic function. No segmental  wall motion abnormalities. Endocardial visualization  enhanced with intravenous injection of Ultrasonic Enhancing  Agent (Definity).  3. Normal right ventricular size and function.  ------------------------------------------------------------------------  Confirmed on  5/2/2021 - 16:12:03 by FLORA Castro  ------------------------------------------------------------------------    < end of copied text >                      <6 <<== 05-02-21 @ 18:16                <6 <<== 05-01-21 @ 18:19                <6 <<== 05-01-21 @ 14:32                              13.2   7.11  )-----------( 236      ( 01 May 2021 14:32 )             41.1     05-01    139  |  104  |  12  ----------------------------<  97  3.9   |  23  |  0.59    Ca    9.2      01 May 2021 14:32  Mg     2.1     05-01    TPro  7.5  /  Alb  4.2  /  TBili  0.8  /  DBili  x   /  AST  16  /  ALT  13  /  AlkPhos  104  05-01    proBNP: Serum Pro-Brain Natriuretic Peptide: 5 pg/mL (05-01 @ 14:32)    Lipid Profile:   HgA1c:   TSH:

## 2021-05-04 NOTE — DISCHARGE NOTE PROVIDER - NSDCMRMEDTOKEN_GEN_ALL_CORE_FT
amLODIPine:  orally   bisoprolol:  orally   bisoprolol:  orally   losartan 50 mg oral tablet: 1 tab(s) orally once a day   amLODIPine:  orally   aspirin 81 mg oral tablet, chewable: 1 tab(s) orally once a day MDD:1  bisoprolol:  orally   losartan 50 mg oral tablet: 1 tab(s) orally once a day

## 2021-05-04 NOTE — CHART NOTE - NSCHARTNOTEFT_GEN_A_CORE
Patient likely possible discharge today 5/4/21  Pt seen at bedside and questioned vaccination status, informs she is already scheduled for vaccination. Offered patient [employer] vaccination, upon discharge, pt refused.     Charles Zavala, LI  x [phone]

## 2021-05-04 NOTE — PROGRESS NOTE ADULT - ASSESSMENT
56F w/ h/o HTN, HLD, pre-diabetes presents w/ exertional sob and epigastric/chest pain x3 weeks, progressively worsening. Denies n/v/d, f/c. States she has not had similar symptoms in the past, denies sick contacts. Was seen in [facility] ED twice last week w/ a negative workup so far.     Problem/Plan - 1:  ·  Problem: Chest pain.  Plan: telemonitor   cards fu  will monitor.   cath -ve  further management as per cards   check CT chest  pulmonary called     Problem/Plan - 2:  ·  Problem: HTN (hypertension).  Plan: DASH diet  cw current meds.

## 2021-05-04 NOTE — DISCHARGE NOTE PROVIDER - NSDCCPCAREPLAN_GEN_ALL_CORE_FT
PRINCIPAL DISCHARGE DIAGNOSIS  Diagnosis: Chest pain  Assessment and Plan of Treatment:       SECONDARY DISCHARGE DIAGNOSES  Diagnosis: Dyspnea on exertion  Assessment and Plan of Treatment: Dyspnea on exertion    Diagnosis: Abdominal pain  Assessment and Plan of Treatment: Abdominal pain    Diagnosis: HTN (hypertension)  Assessment and Plan of Treatment: HTN (hypertension)     PRINCIPAL DISCHARGE DIAGNOSIS  Diagnosis: Chest pain  Assessment and Plan of Treatment:   HOME CARE INSTRUCTIONS  For the next few days, avoid physical activities that bring on chest pain. Continue physical activities as directed.  Do not smoke.  Avoid drinking alcohol.   Only take over-the-counter or prescription medicine for pain, discomfort, or fever as directed by your caregiver.  Follow your caregiver's suggestions for further testing if your chest pain does not go away.  Keep any follow-up appointments you made. If you do not go to an appointment, you could develop lasting (chronic) problems with pain. If there is any problem keeping an appointment, you must call to reschedule.   SEEK MEDICAL CARE IF:  You think you are having problems from the medicine you are taking. Read your medicine instructions carefully.  Your chest pain does not go away, even after treatment.  You develop a rash with blisters on your chest.  SEEK IMMEDIATE MEDICAL CARE IF:  You have increased chest pain or pain that spreads to your arm, neck, jaw, back, or abdomen.   You develop shortness of breath, an increasing cough, or you are coughing up blood.  You have severe back or abdominal pain, feel nauseous, or vomit.  You develop severe weakness, fainting, or chills.  You have a fever.  THIS IS AN EMERGENCY. Do not wait to see if the pain will go away. Get medical help at once. Call your local emergency services (_____________________). Do not drive yourself to the hospital.        SECONDARY DISCHARGE DIAGNOSES  Diagnosis: HTN (hypertension)  Assessment and Plan of Treatment: HTN (hypertension)  Low salt diet  Activity as tolerated.  Take all medication as prescribed.  Follow up with your medical doctor for routine blood pressure monitoring at your next visit.  Notify your doctor if you have any of the following symptoms:   Dizziness, Lightheadedness, Blurry vision, Headache, Chest pain, Shortness of breath      Diagnosis: Dyspnea on exertion  Assessment and Plan of Treatment: Dyspnea on exertion  Follow up with Dr. Jacky Farnsworth in 1-2 weeks for an Pulmonary Function Test.   Continue present medication regimen.    Diagnosis: Abdominal pain  Assessment and Plan of Treatment: Abdominal pain  Follow up with pmd in 1 week.   Continue current medication regimen.

## 2021-05-04 NOTE — DISCHARGE NOTE NURSING/CASE MANAGEMENT/SOCIAL WORK - PATIENT PORTAL LINK FT
You can access the FollowMyHealth Patient Portal offered by [facility] by registering at the following website: [email or web address]. By joining [facility]’s FollowMyHealth portal, you will also be able to view your health information using other applications (apps) compatible with our system.

## 2021-05-04 NOTE — CONSULT NOTE ADULT - ASSESSMENT
Chest pain / SOB  abnormal stress test  asa  cath today     HTN  stable cont current meds     DVT proph  on lovenox      Advanced care planning was discussed with patient and family.  Risks, benefits and alternatives of the cardiac treatments and medical therapy including procedures were discussed in detail and all questions were answered. Importance of compliance with medical therapy and lifestyle modification to improve cardiovascular health were addressed. Appropriate forms and patient educational materials were reviewed. 30 minutes face to face spent.  
57 y/o F with PMH HTN, HLD, pre-diabetes. Presents with exertional SOB x 2 weeks. Endorses ROXANA and LLQ abdominal pain, worse with palpation for the past month. Recent ED admission in [place] - with negative CT A/P (report in chart). S/p cardiac cath with non obstructive CAD. CXR with clear lungs. Sats 98% RA at rest, 99% with exertion.

## 2021-05-04 NOTE — PHYSICAL THERAPY INITIAL EVALUATION ADULT - PLANNED THERAPY INTERVENTIONS, PT EVAL
GOAL: Patient will demonstrate independence with stair negotiation x 12 steps with use of 1 handrail within 2 weeks./gait training

## 2021-05-04 NOTE — PHYSICAL THERAPY INITIAL EVALUATION ADULT - ADDITIONAL COMMENTS
Patient reports that she lives with family ( and 2 daughters) in a 2-level private house, bedroom/bathroom upstairs. Reports being an independent community ambulator at baseline.

## 2021-05-04 NOTE — CONSULT NOTE ADULT - PROBLEM SELECTOR RECOMMENDATION 3
pain seems more LUQ and LLL abdominal pain rather than chest pain   -S/p cath, non obstructive CAD  -No chest pain or pleuritic CP at this time

## 2021-05-04 NOTE — DISCHARGE NOTE PROVIDER - PROVIDER TOKENS
PROVIDER:[TOKEN:[[id number]],FOLLOWUP:[1 week],ESTABLISHEDPATIENT:[T]],PROVIDER:[TOKEN:[[id number]],FOLLOWUP:[1 week],ESTABLISHEDPATIENT:[T]]

## 2021-05-04 NOTE — PROGRESS NOTE ADULT - ASSESSMENT
Chest pain / SOB  abnormal stress test  asa  cath unremarkable     SCHREIBER  recommend D Dimer vs CTPA  consider pulm eval     HTN  stable cont current meds     DVT proph  on lovenox      Advanced care planning was discussed with patient and family.  Risks, benefits and alternatives of the cardiac treatments and medical therapy including procedures were discussed in detail and all questions were answered. Importance of compliance with medical therapy and lifestyle modification to improve cardiovascular health were addressed. Appropriate forms and patient educational materials were reviewed. 30 minutes face to face spent.

## 2021-05-04 NOTE — DISCHARGE NOTE PROVIDER - HOSPITAL COURSE
55 y/o F with PMH HTN, HLD, pre-diabetes. Presents with exertional SOB x 2 weeks, and epigastric/chest pain. Endorses ROXANA and LLQ abdominal pain, worse with palpation for the past month. Recent ED admission in [place] - with negative CT A/P. Denies fevers, chills, cough, constipation, pleuritic CP, constipation, diarrhea.    Negative troponin  CXR: clear  Abdominal US: normal   TTE: normal   Stress test: Small, mild defect in the basal septal wall that is  reversible with abnormal wall motion consistent with  ischemia  Cardiac cath: non obstructive artery disease  D-Dimer: <150   55 y/o F with PMH HTN, HLD, pre-diabetes. Presents with exertional SOB x 2 weeks, and epigastric/chest pain. Endorses ROXANA and LLQ abdominal pain, worse with palpation for the past month. Recent ED admission in [place] - with negative CT A/P. Denies fevers, chills, cough, constipation, pleuritic CP, constipation, diarrhea. Pt has negative cardiac workup, other than an abnormal stress test, low suspicion for ACS. Low suspicion for PE. Oxygen sat remained high with ambulation.     Negative troponin  CXR: clear  Abdominal US: normal   TTE: normal   Stress test: Small, mild defect in the basal septal wall that is  reversible with abnormal wall motion consistent with  ischemia  Cardiac cath: non obstructive artery disease  D-Dimer: <150

## 2021-05-04 NOTE — PROGRESS NOTE ADULT - ASSESSMENT
56F w/ h/o HTN, HLD, pre-diabetes presents w/ exertional sob and epigastric/chest pain x3 weeks, progressively worsening. Denies n/v/d, f/c. States she has not had similar symptoms in the past, denies sick contacts. Was seen in [facility] ED twice last week w/ a negative workup so far.     Problem/Plan - 1:  ·  Problem: Chest pain.  Plan: telemonitor   cards fu  will monitor.   cath -ve  further management as per cards   pulmonary fu appreciated  check CT chest abdomen and pelvis     Problem/Plan - 2:  ·  Problem: HTN (hypertension).  Plan: DASH diet  cw current meds.

## 2021-05-04 NOTE — CONSULT NOTE ADULT - PROBLEM SELECTOR RECOMMENDATION 9
unclear cause - it seems that her L sided abdominal pain is playing a role in her dyspnea   -Normoxic. Sats 98% RA at rest, 99% RA with exertion. Pt mildly dyspneic with exertion.   -PE unlikely, ddimer <150  -Check CT chest non contrast  -Check bedside spirometry unclear cause - it seems that her L sided abdominal pain is playing a role in her dyspnea   -Normoxic. Sats 98% RA at rest, 99% RA with exertion. Pt mildly dyspneic with exertion.   -PE unlikely, ddimer <150, but will check CTA chest and CT A/P with oral contrast given abdominal pain  -Check bedside spirometry

## 2021-05-04 NOTE — DISCHARGE NOTE NURSING/CASE MANAGEMENT/SOCIAL WORK - NSDPLANG ASIS_GEN_ALL_CORE
Subjective


Progress Note for:: 04/06/19


Subjective:: 





42 MI before the age of 50.








4/4/[id number]-gjum-[facility] No

## 2021-05-05 VITALS
OXYGEN SATURATION: 97 % | TEMPERATURE: 98 F | SYSTOLIC BLOOD PRESSURE: 119 MMHG | DIASTOLIC BLOOD PRESSURE: 84 MMHG | HEART RATE: 81 BPM | RESPIRATION RATE: 18 BRPM

## 2021-05-05 LAB
T3 SERPL-MCNC: 76 NG/DL — LOW (ref 80–200)
T4 AB SER-ACNC: 4.6 UG/DL — SIGNIFICANT CHANGE UP (ref 4.6–12)

## 2021-05-05 PROCEDURE — 82330 ASSAY OF CALCIUM: CPT

## 2021-05-05 PROCEDURE — 83690 ASSAY OF LIPASE: CPT

## 2021-05-05 PROCEDURE — 82803 BLOOD GASES ANY COMBINATION: CPT

## 2021-05-05 PROCEDURE — A9500: CPT

## 2021-05-05 PROCEDURE — 82550 ASSAY OF CK (CPK): CPT

## 2021-05-05 PROCEDURE — 85014 HEMATOCRIT: CPT

## 2021-05-05 PROCEDURE — 80061 LIPID PANEL: CPT

## 2021-05-05 PROCEDURE — 85027 COMPLETE CBC AUTOMATED: CPT

## 2021-05-05 PROCEDURE — 85025 COMPLETE CBC W/AUTO DIFF WBC: CPT

## 2021-05-05 PROCEDURE — C1887: CPT

## 2021-05-05 PROCEDURE — 81003 URINALYSIS AUTO W/O SCOPE: CPT

## 2021-05-05 PROCEDURE — 84480 ASSAY TRIIODOTHYRONINE (T3): CPT

## 2021-05-05 PROCEDURE — 94010 BREATHING CAPACITY TEST: CPT | Mod: 26

## 2021-05-05 PROCEDURE — 84132 ASSAY OF SERUM POTASSIUM: CPT

## 2021-05-05 PROCEDURE — 84443 ASSAY THYROID STIM HORMONE: CPT

## 2021-05-05 PROCEDURE — 82435 ASSAY OF BLOOD CHLORIDE: CPT

## 2021-05-05 PROCEDURE — U0005: CPT

## 2021-05-05 PROCEDURE — 71275 CT ANGIOGRAPHY CHEST: CPT

## 2021-05-05 PROCEDURE — G0378: CPT

## 2021-05-05 PROCEDURE — 85610 PROTHROMBIN TIME: CPT

## 2021-05-05 PROCEDURE — 83036 HEMOGLOBIN GLYCOSYLATED A1C: CPT

## 2021-05-05 PROCEDURE — 85379 FIBRIN DEGRADATION QUANT: CPT

## 2021-05-05 PROCEDURE — C1769: CPT

## 2021-05-05 PROCEDURE — 36000 PLACE NEEDLE IN VEIN: CPT

## 2021-05-05 PROCEDURE — 83735 ASSAY OF MAGNESIUM: CPT

## 2021-05-05 PROCEDURE — 74177 CT ABD & PELVIS W/CONTRAST: CPT

## 2021-05-05 PROCEDURE — 80053 COMPREHEN METABOLIC PANEL: CPT

## 2021-05-05 PROCEDURE — 94010 BREATHING CAPACITY TEST: CPT

## 2021-05-05 PROCEDURE — 99285 EMERGENCY DEPT VISIT HI MDM: CPT | Mod: 25

## 2021-05-05 PROCEDURE — 86803 HEPATITIS C AB TEST: CPT

## 2021-05-05 PROCEDURE — 76700 US EXAM ABDOM COMPLETE: CPT

## 2021-05-05 PROCEDURE — 82553 CREATINE MB FRACTION: CPT

## 2021-05-05 PROCEDURE — C1894: CPT

## 2021-05-05 PROCEDURE — C8929: CPT

## 2021-05-05 PROCEDURE — 93458 L HRT ARTERY/VENTRICLE ANGIO: CPT

## 2021-05-05 PROCEDURE — 93017 CV STRESS TEST TRACING ONLY: CPT

## 2021-05-05 PROCEDURE — 84436 ASSAY OF TOTAL THYROXINE: CPT

## 2021-05-05 PROCEDURE — 85018 HEMOGLOBIN: CPT

## 2021-05-05 PROCEDURE — 86769 SARS-COV-2 COVID-19 ANTIBODY: CPT

## 2021-05-05 PROCEDURE — 78452 HT MUSCLE IMAGE SPECT MULT: CPT

## 2021-05-05 PROCEDURE — 84484 ASSAY OF TROPONIN QUANT: CPT

## 2021-05-05 PROCEDURE — 84295 ASSAY OF SERUM SODIUM: CPT

## 2021-05-05 PROCEDURE — 97161 PT EVAL LOW COMPLEX 20 MIN: CPT

## 2021-05-05 PROCEDURE — 83880 ASSAY OF NATRIURETIC PEPTIDE: CPT

## 2021-05-05 PROCEDURE — 82947 ASSAY GLUCOSE BLOOD QUANT: CPT

## 2021-05-05 PROCEDURE — 71046 X-RAY EXAM CHEST 2 VIEWS: CPT

## 2021-05-05 PROCEDURE — 83605 ASSAY OF LACTIC ACID: CPT

## 2021-05-05 PROCEDURE — 82962 GLUCOSE BLOOD TEST: CPT

## 2021-05-05 PROCEDURE — 87086 URINE CULTURE/COLONY COUNT: CPT

## 2021-05-05 PROCEDURE — 80048 BASIC METABOLIC PNL TOTAL CA: CPT

## 2021-05-05 PROCEDURE — 85730 THROMBOPLASTIN TIME PARTIAL: CPT

## 2021-05-05 PROCEDURE — U0003: CPT

## 2021-05-05 RX ORDER — ASPIRIN/CALCIUM CARB/MAGNESIUM 324 MG
1 TABLET ORAL
Qty: 30 | Refills: 0
Start: 2021-05-05 | End: 2021-06-03

## 2021-05-05 RX ADMIN — LOSARTAN POTASSIUM 50 MILLIGRAM(S): 100 TABLET, FILM COATED ORAL at 06:27

## 2021-05-05 RX ADMIN — Medication 81 MILLIGRAM(S): at 10:24

## 2021-05-05 NOTE — PROGRESS NOTE ADULT - ASSESSMENT
56F w/ h/o HTN, HLD, pre-diabetes presents w/ exertional sob and epigastric/chest pain x3 weeks, progressively worsening. Denies n/v/d, f/c. States she has not had similar symptoms in the past, denies sick contacts. Was seen in [facility] ED twice last week w/ a negative workup so far.     Problem/Plan - 1:  ·  Problem: Chest pain.  Plan: telemonitor   cards fu  will monitor.   cath -ve  further management as per cards   pulmonary fu appreciated   CT chest abdomen and pelvis -ve    Problem/Plan - 2:  ·  Problem: HTN (hypertension).  Plan: DASH diet  cw current meds.     dc planning if cleared by Pulmonary

## 2021-05-05 NOTE — PROGRESS NOTE ADULT - ASSESSMENT
Chest pain / SOB  abnormal stress test  asa  cath unremarkable     sob  d dimer is neg  ct prelim reviewed  fu with official results  consider pulm eval     HTN  stable cont current meds     DVT proph  on lovenox

## 2021-05-05 NOTE — PROGRESS NOTE ADULT - ASSESSMENT
55 y/o F with PMH HTN, HLD, pre-diabetes. Presents with exertional SOB x 2 weeks. Endorses ROXANA and LLQ abdominal pain, worse with palpation for the past month. Recent ED admission in [place] - with negative CT A/P (report in chart). S/p cardiac cath with non obstructive CAD. CXR with clear lungs. Sats 98% RA at rest, 99% with exertion.

## 2021-05-05 NOTE — PROGRESS NOTE ADULT - PROBLEM SELECTOR PLAN 1
unclear cause - it seems that her L sided abdominal pain is playing a role in her dyspnea   -Normoxic. Sats 98% RA at rest, 99% RA with exertion. Pt mildly dyspneic with exertion.   -PE unlikely, ddimer <150, CTA chest neg PE  -Will f/u bedside spirometry unclear cause - it seems that her L sided abdominal pain is playing a role in her dyspnea   -Normoxic. Sats 98% RA at rest, 99% RA with exertion. Pt mildly dyspneic with exertion.   -PE unlikely, ddimer <150, CTA chest neg PE  -Bedside spirometry grossly normal  -D/c planning from pulm perspective, suggest full outpatient PFTs. Card given to pt for follow up.

## 2021-05-05 NOTE — PROGRESS NOTE ADULT - PROBLEM SELECTOR PLAN 2
pain seems more LUQ and LLL abdominal pain rather than chest pain  -Had CT A/P at OSH recently which was unremarkable as per report.  -CT A/P with ?inflammatory changed R femoral artery.

## 2021-05-05 NOTE — PROGRESS NOTE ADULT - PROBLEM SELECTOR PLAN 3
pain seems more LUQ and LLL abdominal pain rather than chest pain   -S/p cath, non obstructive CAD  -No chest pain or pleuritic CP at this time.

## 2021-05-05 NOTE — PROGRESS NOTE ADULT - SUBJECTIVE AND OBJECTIVE BOX
Follow-up Pulm Progress Note    SCHREIBER and L sided abdominal pain unchanged  Sats 97% RA    Medications:  MEDICATIONS  (STANDING):  aspirin  chewable 81 milliGRAM(s) Oral daily  enoxaparin Injectable 40 milliGRAM(s) SubCutaneous daily  losartan 50 milliGRAM(s) Oral daily  polyethylene glycol 3350 17 Gram(s) Oral daily        Vital Signs Last 24 Hrs  T(C): 36.7 (05 May 2021 12:12), Max: 36.7 (05 May 2021 04:21)  T(F): 98 (05 May 2021 12:12), Max: 98 (05 May 2021 04:21)  HR: 81 (05 May 2021 12:12) (75 - 93)  BP: 119/84 (05 May 2021 12:12) (101/60 - 119/84)  BP(mean): --  RR: 18 (05 May 2021 12:12) (18 - 18)  SpO2: 97% (05 May 2021 12:12) (96% - 99%)          05-04 @ 07:01  -  05-05 @ 07:00  --------------------------------------------------------  IN: 360 mL / OUT: 0 mL / NET: 360 mL          LABS:                        12.9   5.95  )-----------( 218      ( 04 May 2021 07:22 )             40.2     05-04    143  |  107  |  18  ----------------------------<  96  4.2   |  20<L>  |  0.63    Ca    8.9      04 May 2021 07:22              CULTURES:     Culture - Urine (collected 05-01-21 @ 18:26)  Source: .Urine Clean Catch (Midstream)  Final Report (05-02-21 @ 14:24):    <10,000 CFU/mL Normal Urogenital Shakila        Physical Examination:  PULM: Clear to auscultation bilaterally, no significant sputum production  CVS: S1, S2 heard      RADIOLOGY REVIEWED  CT chest: < from: CT Angio Chest w/ IV Cont (05.04.21 @ 21:17) >  FINDINGS:  CHEST:  LUNGS AND LARGE AIRWAYS: Patent central airways. A 0.2 cm right lower lobe subpleural nodule (5:70).  PLEURA: No pleural effusion.  VESSELS: Thoracic aorta and main pulmonary artery are normal in caliber. No pulmonary embolism.  HEART: Heart size is normal. No pericardial effusion.  MEDIASTINUM AND SHERYL: No lymphadenopathy.  CHEST WALL AND LOWER NECK: Within normal limits.    ABDOMEN AND PELVIS:  LIVER: Within normal limits.  BILE DUCTS: Normal caliber.  GALLBLADDER: Within normal limits.  SPLEEN: Within normal limits.  PANCREAS: Within normal limits.  ADRENALS: Within normal limits.  KIDNEYS/URETERS: Subcentimeter right renal hypodensities that are too small to characterize.    BLADDER: Within normal limits.  REPRODUCTIVE ORGANS: Normal uterus and bilateral ovaries.    BOWEL: No bowel obstruction. Appendix is normal.  PERITONEUM: No ascites.  VESSELS: Within normal limits. Partially imaged ill-defined inflammatory change about the right femoral artery and vein (3:113-136) in the inguinal region with unremarkable visualized portions of the vessels at that level, of uncertain etiology.  RETROPERITONEUM/LYMPH NODES: No lymphadenopathy.  ABDOMINAL WALL: A small fat-containing umbilical hernia.  BONES: Within normal limits.    IMPRESSION:  No pulmonary embolism.    Ill-defined inflammatory changeabout the right femoral artery and vein in the inguinal region, of uncertain etiology. Correlate with physical exam to exclude infection and consider Doppler ultrasound to exclude right lower extremity thrombus.    < end of copied text >  
DATE OF SERVICE: 05-04-21 @ 07:05    Subjective: Patient seen and examined. No new events except as noted.     SUBJECTIVE/ROS:  feels ok but still has SCHREIBER     MEDICATIONS:  MEDICATIONS  (STANDING):  aspirin  chewable 81 milliGRAM(s) Oral daily  enoxaparin Injectable 40 milliGRAM(s) SubCutaneous daily  losartan 50 milliGRAM(s) Oral daily  polyethylene glycol 3350 17 Gram(s) Oral daily      PHYSICAL EXAM:  T(C): 36.8 (05-04-21 @ 04:26), Max: 36.9 (05-03-21 @ 10:30)  HR: 84 (05-04-21 @ 04:26) (80 - 95)  BP: 102/62 (05-04-21 @ 04:26) (88/53 - 133/88)  RR: 18 (05-04-21 @ 04:26) (16 - 18)  SpO2: 98% (05-04-21 @ 04:26) (96% - 100%)  Wt(kg): --  I&O's Summary    03 May 2021 07:01  -  04 May 2021 07:00  --------------------------------------------------------  IN: 600 mL / OUT: 0 mL / NET: 600 mL            JVP: Normal  Neck: supple  Lung: clear   CV: S1 S2 , Murmur:  Abd: soft  Ext: No edema  neuro: Awake / alert  Psych: flat affect  Skin: normal``    LABS/DATA:    CARDIAC MARKERS:  CARDIAC MARKERS ( 03 May 2021 06:54 )  x     / x     / 58 U/L / x     / x      CARDIAC MARKERS ( 02 May 2021 18:16 )  x     / x     / 75 U/L / x     / 1.0 ng/mL                                13.3   6.50  )-----------( 192      ( 03 May 2021 06:54 )             41.2     05-03    137  |  106  |  20  ----------------------------<  92  4.0   |  19<L>  |  0.60    Ca    9.0      03 May 2021 06:54    TPro  6.8  /  Alb  3.9  /  TBili  0.8  /  DBili  x   /  AST  15  /  ALT  14  /  AlkPhos  95  05-03    proBNP:   Lipid Profile:   HgA1c:   TSH:     TELE:  EKG:  < from: TTE with Doppler (w/Cont) (05.02.21 @ 07:46) >  Conclusions:  1. Normal left ventricular internal dimensions and wall  thicknesses.  2. Normal left ventricular systolic function. No segmental  wall motion abnormalities. Endocardial visualization  enhanced with intravenous injection of Ultrasonic Enhancing  Agent (Definity).  3. Normal right ventricular size and function.  ------------------------------------------------------------------------  Confirmed on  5/2/2021 - 16:12:03 by FLORA Castro  ------------------------------------------------------------------------    < end of copied text >    c< from: Cardiac Cath Lab - Adult (05.03.21 @ 09:58) >  CORONARY VESSELS: The coronary circulation is right dominant.  LM:   --  LM: Angiography showed minor luminal irregularities with no flow  limiting lesions.  LAD:   --  LAD: Angiography showed minor luminal irregularities with no  flow limiting lesions.  CX:   --  Circumflex: Angiography showed minor luminal irregularities with  no flow limiting lesions.  RCA:   --  RCA: Angiography showed minor luminal irregularities with no  flow limiting lesions.  COMPLICATIONS: There were no complications.  DIAGNOSTIC RECOMMENDATIONS: Mild luminal irregularities. Recommend  aggressive medical therapy for non-obstructive coronary artery disease.  Findings relayed to patient and patient's cardiologist.  INTERVENTIONAL RECOMMENDATIONS: Mild luminal irregularities. Recommend  aggressive medical therapy for non-obstructive coronary artery disease.  Findings relayed to patient and patient's cardiologist.  Prepared and signed by  Shine Alcaraz M.D.  Signed 05/03/2021 16:25:42  HEMODYNAMIC TABLES  Pressures:  Baseline  Pressures:  - HR: 85  Pressures:  - Rhythm:    < end of copied text >      
Patient is a 56y old  Female who presents with a chief complaint of SOB (03 May 2021 07:01)    Date of servie : 05-03-21 @ 15:20  INTERVAL HPI/OVERNIGHT EVENTS:  T(C): 36.9 (05-03-21 @ 13:00), Max: 36.9 (05-02-21 @ 15:38)  HR: 90 (05-03-21 @ 13:00) (75 - 103)  BP: 120/84 (05-03-21 @ 13:00) (88/53 - 135/90)  RR: 16 (05-03-21 @ 13:00) (16 - 18)  SpO2: 99% (05-03-21 @ 13:00) (96% - 100%)  Wt(kg): --  I&O's Summary    02 May 2021 07:01  -  03 May 2021 07:00  --------------------------------------------------------  IN: 120 mL / OUT: 0 mL / NET: 120 mL    03 May 2021 07:01  -  03 May 2021 15:20  --------------------------------------------------------  IN: 240 mL / OUT: 0 mL / NET: 240 mL        LABS:                        13.3   6.50  )-----------( 192      ( 03 May 2021 06:54 )             41.2     05-03    137  |  106  |  20  ----------------------------<  92  4.0   |  19<L>  |  0.60    Ca    9.0      03 May 2021 06:54  Mg     2.1     05-01    TPro  6.8  /  Alb  3.9  /  TBili  0.8  /  DBili  x   /  AST  15  /  ALT  14  /  AlkPhos  95  05-03        CAPILLARY BLOOD GLUCOSE                MEDICATIONS  (STANDING):  aspirin  chewable 81 milliGRAM(s) Oral daily  enoxaparin Injectable 40 milliGRAM(s) SubCutaneous daily  losartan 50 milliGRAM(s) Oral daily  polyethylene glycol 3350 17 Gram(s) Oral daily    MEDICATIONS  (PRN):          PHYSICAL EXAM:  GENERAL: NAD, well-groomed, well-developed  HEAD:  Atraumatic, Normocephalic  CHEST/LUNG: Clear to percussion bilaterally; No rales, rhonchi, wheezing, or rubs  HEART: Regular rate and rhythm; No murmurs, rubs, or gallops  ABDOMEN: Soft, Nontender, Nondistended; Bowel sounds present  EXTREMITIES:  2+ Peripheral Pulses, No clubbing, cyanosis, or edema  LYMPH: No lymphadenopathy noted  SKIN: No rashes or lesions    Care Discussed with Consultants/Other Providers [ ] YES  [ ] NO
Patient is a 56y old  Female who presents with a chief complaint of SOB (04 May 2021 15:22)    Date of servie : 05-04-21 @ 15:40  INTERVAL HPI/OVERNIGHT EVENTS:  T(C): 36.3 (05-04-21 @ 12:34), Max: 36.9 (05-04-21 @ 00:36)  HR: 94 (05-04-21 @ 12:34) (84 - 95)  BP: 117/78 (05-04-21 @ 12:34) (102/62 - 125/62)  RR: 18 (05-04-21 @ 12:34) (16 - 18)  SpO2: 97% (05-04-21 @ 12:34) (97% - 99%)  Wt(kg): --  I&O's Summary    03 May 2021 07:01  -  04 May 2021 07:00  --------------------------------------------------------  IN: 600 mL / OUT: 0 mL / NET: 600 mL        LABS:                        12.9   5.95  )-----------( 218      ( 04 May 2021 07:22 )             40.2     05-04    143  |  107  |  18  ----------------------------<  96  4.2   |  20<L>  |  0.63    Ca    8.9      04 May 2021 07:22    TPro  6.8  /  Alb  3.9  /  TBili  0.8  /  DBili  x   /  AST  15  /  ALT  14  /  AlkPhos  95  05-03        CAPILLARY BLOOD GLUCOSE                MEDICATIONS  (STANDING):  aspirin  chewable 81 milliGRAM(s) Oral daily  enoxaparin Injectable 40 milliGRAM(s) SubCutaneous daily  losartan 50 milliGRAM(s) Oral daily  polyethylene glycol 3350 17 Gram(s) Oral daily    MEDICATIONS  (PRN):          PHYSICAL EXAM:  GENERAL: NAD, well-groomed, well-developed  HEAD:  Atraumatic, Normocephalic  CHEST/LUNG: Clear to percussion bilaterally; No rales, rhonchi, wheezing, or rubs  HEART: Regular rate and rhythm; No murmurs, rubs, or gallops  ABDOMEN: Soft, Nontender, Nondistended; Bowel sounds present  EXTREMITIES:  2+ Peripheral Pulses, No clubbing, cyanosis, or edema  LYMPH: No lymphadenopathy noted  SKIN: No rashes or lesions    Care Discussed with Consultants/Other Providers [ ] YES  [ ] NO
Patient is a 56y old  Female who presents with a chief complaint of SOB (05 May 2021 12:56)    Date of servie : 05-05-21 @ 15:15  INTERVAL HPI/OVERNIGHT EVENTS:  T(C): 36.7 (05-05-21 @ 12:12), Max: 36.7 (05-05-21 @ 04:21)  HR: 81 (05-05-21 @ 12:12) (75 - 93)  BP: 119/84 (05-05-21 @ 12:12) (101/60 - 119/84)  RR: 18 (05-05-21 @ 12:12) (18 - 18)  SpO2: 97% (05-05-21 @ 12:12) (96% - 99%)  Wt(kg): --  I&O's Summary    04 May 2021 07:01  -  05 May 2021 07:00  --------------------------------------------------------  IN: 360 mL / OUT: 0 mL / NET: 360 mL    05 May 2021 07:01  -  05 May 2021 15:15  --------------------------------------------------------  IN: 360 mL / OUT: 0 mL / NET: 360 mL        LABS:                        12.9   5.95  )-----------( 218      ( 04 May 2021 07:22 )             40.2     05-04    143  |  107  |  18  ----------------------------<  96  4.2   |  20<L>  |  0.63    Ca    8.9      04 May 2021 07:22          CAPILLARY BLOOD GLUCOSE                MEDICATIONS  (STANDING):  aspirin  chewable 81 milliGRAM(s) Oral daily  enoxaparin Injectable 40 milliGRAM(s) SubCutaneous daily  losartan 50 milliGRAM(s) Oral daily  polyethylene glycol 3350 17 Gram(s) Oral daily    MEDICATIONS  (PRN):          PHYSICAL EXAM:  GENERAL: NAD, well-groomed, well-developed  HEAD:  Atraumatic, Normocephalic  CHEST/LUNG: Clear to percussion bilaterally; No rales, rhonchi, wheezing, or rubs  HEART: Regular rate and rhythm; No murmurs, rubs, or gallops  ABDOMEN: Soft, Nontender, Nondistended; Bowel sounds present  EXTREMITIES:  2+ Peripheral Pulses, No clubbing, cyanosis, or edema  LYMPH: No lymphadenopathy noted  SKIN: No rashes or lesions    Care Discussed with Consultants/Other Providers [ ] YES  [ ] NO
DATE OF SERVICE: 05-05-21 @ 06:47    Subjective: Patient seen and examined. No new events except as noted.     SUBJECTIVE/ROS:  feels ok   no new complaints       MEDICATIONS:  MEDICATIONS  (STANDING):  aspirin  chewable 81 milliGRAM(s) Oral daily  enoxaparin Injectable 40 milliGRAM(s) SubCutaneous daily  losartan 50 milliGRAM(s) Oral daily  polyethylene glycol 3350 17 Gram(s) Oral daily      PHYSICAL EXAM:  T(C): 36.7 (05-05-21 @ 04:21), Max: 36.7 (05-05-21 @ 04:21)  HR: 91 (05-05-21 @ 04:21) (85 - 94)  BP: 113/73 (05-05-21 @ 04:21) (108/73 - 117/78)  RR: 18 (05-05-21 @ 04:21) (18 - 18)  SpO2: 99% (05-05-21 @ 04:21) (96% - 99%)  Wt(kg): --  I&O's Summary    03 May 2021 07:01  -  04 May 2021 07:00  --------------------------------------------------------  IN: 600 mL / OUT: 0 mL / NET: 600 mL    04 May 2021 07:01  -  05 May 2021 06:47  --------------------------------------------------------  IN: 360 mL / OUT: 0 mL / NET: 360 mL            JVP: Normal  Neck: supple  Lung: clear   CV: S1 S2 , Murmur:  Abd: soft  Ext: No edema  neuro: Awake / alert  Psych: flat affect  Skin: normal``    LABS/DATA:    CARDIAC MARKERS:  CARDIAC MARKERS ( 03 May 2021 06:54 )  x     / x     / 58 U/L / x     / x      CARDIAC MARKERS ( 02 May 2021 18:16 )  x     / x     / 75 U/L / x     / 1.0 ng/mL                                12.9   5.95  )-----------( 218      ( 04 May 2021 07:22 )             40.2     05-04    143  |  107  |  18  ----------------------------<  96  4.2   |  20<L>  |  0.63    Ca    8.9      04 May 2021 07:22    TPro  6.8  /  Alb  3.9  /  TBili  0.8  /  DBili  x   /  AST  15  /  ALT  14  /  AlkPhos  95  05-03    proBNP:   Lipid Profile:   HgA1c:   TSH:     TELE:  EKG:    < from: CT Angio Chest w/ IV Cont (05.04.21 @ 21:17) >  INTERPRETATION:  no pulmonary embolism.  nonspecific right inguinal fat stranding.  no additional acute intrathoracic or intraabdominal pathology is seen.  f/u official report.      < end of copied text >      
Patient is a 56y old  Female who presents with a chief complaint of SOB (04 May 2021 15:40)    Date of servie : 05-04-21 @ 17:09  INTERVAL HPI/OVERNIGHT EVENTS:  T(C): 36.6 (05-04-21 @ 15:50), Max: 36.9 (05-04-21 @ 00:36)  HR: 93 (05-04-21 @ 15:50) (84 - 95)  BP: 108/76 (05-04-21 @ 15:50) (102/62 - 125/62)  RR: 18 (05-04-21 @ 15:50) (16 - 18)  SpO2: 96% (05-04-21 @ 15:50) (96% - 99%)  Wt(kg): --  I&O's Summary    03 May 2021 07:01  -  04 May 2021 07:00  --------------------------------------------------------  IN: 600 mL / OUT: 0 mL / NET: 600 mL        LABS:                        12.9   5.95  )-----------( 218      ( 04 May 2021 07:22 )             40.2     05-04    143  |  107  |  18  ----------------------------<  96  4.2   |  20<L>  |  0.63    Ca    8.9      04 May 2021 07:22    TPro  6.8  /  Alb  3.9  /  TBili  0.8  /  DBili  x   /  AST  15  /  ALT  14  /  AlkPhos  95  05-03        CAPILLARY BLOOD GLUCOSE                MEDICATIONS  (STANDING):  aspirin  chewable 81 milliGRAM(s) Oral daily  enoxaparin Injectable 40 milliGRAM(s) SubCutaneous daily  losartan 50 milliGRAM(s) Oral daily  polyethylene glycol 3350 17 Gram(s) Oral daily    MEDICATIONS  (PRN):          PHYSICAL EXAM:  GENERAL: NAD, well-groomed, well-developed  HEAD:  Atraumatic, Normocephalic  CHEST/LUNG: Clear to percussion bilaterally; No rales, rhonchi, wheezing, or rubs  HEART: Regular rate and rhythm; No murmurs, rubs, or gallops  ABDOMEN: Soft, Nontender, Nondistended; Bowel sounds present  EXTREMITIES:  2+ Peripheral Pulses, No clubbing, cyanosis, or edema  LYMPH: No lymphadenopathy noted  SKIN: No rashes or lesions    Care Discussed with Consultants/Other Providers [ ] YES  [ ] NO

## 2021-05-05 NOTE — PROGRESS NOTE ADULT - ATTENDING COMMENTS
pt ok for dc  spirometry wnl  suggest pft's w [facility] as outpt if normal and pt still w [facility] then suggest CPET  fu in office

## 2023-05-19 NOTE — ED PROVIDER NOTE - CARE PLAN
Pt provided discharge instructions and verbalizes understanding. Pt expresses her appreciation multiple times. Pt ambulates from ED with steady gait, all belongings, and her discharge paperwork.   Principal Discharge DX:	Chest pain  Secondary Diagnosis:	Shortness of breath

## 2023-09-26 NOTE — PHYSICAL THERAPY INITIAL EVALUATION ADULT - PERTINENT HX OF CURRENT PROBLEM, REHAB EVAL
56F w/ h/o HTN, HLD, pre-diabetes presents w/ exertional sob and epigastric/chest pain x3 weeks, progressively worsening. Denies n/v/d, f/c. States she has not had similar symptoms in the past, denies sick contacts. Was seen in [facility] ED twice last week w/ a negative workup so far. S/P diagnostic cardiac cath 5/3, (-).
0 (no pain/absence of nonverbal indicators of pain)

## 2024-10-10 NOTE — ED PROVIDER NOTE - IV ALTEPLASE ADMIN OUTSIDE HIDDEN
Advocate Citizens Memorial Healthcare Louisville Medical Center     show